# Patient Record
Sex: MALE | Race: WHITE | NOT HISPANIC OR LATINO
[De-identification: names, ages, dates, MRNs, and addresses within clinical notes are randomized per-mention and may not be internally consistent; named-entity substitution may affect disease eponyms.]

---

## 2023-03-16 PROBLEM — Z00.00 ENCOUNTER FOR PREVENTIVE HEALTH EXAMINATION: Status: ACTIVE | Noted: 2023-03-16

## 2023-03-20 ENCOUNTER — APPOINTMENT (OUTPATIENT)
Dept: ORTHOPEDIC SURGERY | Facility: CLINIC | Age: 28
End: 2023-03-20
Payer: COMMERCIAL

## 2023-03-20 VITALS — HEIGHT: 68 IN | WEIGHT: 155 LBS | BODY MASS INDEX: 23.49 KG/M2

## 2023-03-20 DIAGNOSIS — Z78.9 OTHER SPECIFIED HEALTH STATUS: ICD-10-CM

## 2023-03-20 PROCEDURE — 99204 OFFICE O/P NEW MOD 45 MIN: CPT

## 2023-03-20 PROCEDURE — 73010 X-RAY EXAM OF SHOULDER BLADE: CPT | Mod: RT

## 2023-03-20 PROCEDURE — 73030 X-RAY EXAM OF SHOULDER: CPT | Mod: RT

## 2023-03-20 NOTE — REASON FOR VISIT
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  He felt it come out, then shifted it back into place on his own.  He had some tingling, that has subsided.  He continued to climb the remainder of that day and was doing fine.  He has not been back to climbing since, as he tried with discomfort.  He is sleeping.  He can reach and lift.  He saw his PCP and was sent for an MRI.

## 2023-03-20 NOTE — HISTORY OF PRESENT ILLNESS
[Sudden] : sudden [5] : 5 [1] : 2 [Rest] : rest [Meds] : meds [] : no [FreeTextEntry1] : right shoulder  [FreeTextEntry3] : 02/24/2023 [FreeTextEntry5] : pt was rock climbing at the gym and his foot slipped and he partial dislocated his right shoulder about 3 weeks ago [FreeTextEntry9] : [facility]  [de-identified] : movement  [de-identified] : 03/2023 [de-identified] : his PCP  [de-identified] : MRI

## 2023-03-20 NOTE — ASSESSMENT
[FreeTextEntry1] : We reviewed the findings and the history.\par PT is prescribed. \par He will follow up in 6 weeks. \par Questions were answered and concerned addressed.\par The options were outlined.\par Return to activities outlined. \par \par Patient seen by Dr. Hermes Fox.\par Progress note completed by Nilda HUANG.\par Patient seen by Nilda HUANG under the supervision of Dr. Hermes Fox.\par Entered by Nilda HUANG acting as a scribe for Dr. Hermes Fox.\par Entered by Trinidad Zafar acting as scribe.

## 2023-03-20 NOTE — CONSULT LETTER
[Dear  ___] : Dear  [unfilled], [Consult Letter:] : I had the pleasure of evaluating your patient, [unfilled]. [Please see my note below.] : Please see my note below. [Consult Closing:] : Thank you very much for allowing me to participate in the care of this patient.  If you have any questions, please do not hesitate to contact me. [Sincerely,] : Sincerely, [FreeTextEntry3] : Hermes Dunham M.D.\par Shoulder Surgery

## 2023-03-20 NOTE — PHYSICAL EXAM
[Right] : right shoulder [Minimal] : minimal [5 ___] : forward flexion 5[unfilled]/5 [5___] : external rotation 5[unfilled]/5 [] : no sensory deficits [Left] : left shoulder [Sitting] : sitting [de-identified] : No pain with posterior stress.  [FreeTextEntry9] : IR to T10. [TWNoteComboBox4] : passive forward flexion 175 degrees [de-identified] : external rotation 90 degrees

## 2023-05-01 ENCOUNTER — APPOINTMENT (OUTPATIENT)
Dept: ORTHOPEDIC SURGERY | Facility: CLINIC | Age: 28
End: 2023-05-01
Payer: COMMERCIAL

## 2023-05-01 VITALS — BODY MASS INDEX: 22.73 KG/M2 | HEIGHT: 68 IN | WEIGHT: 150 LBS

## 2023-05-01 PROCEDURE — 99214 OFFICE O/P EST MOD 30 MIN: CPT

## 2023-05-01 NOTE — HISTORY OF PRESENT ILLNESS
[1] : 2 [0] : 0 [de-identified] : pt is here today for a follow up for his right shoulder. pt states his pain has improved since last visit, has very minimal pain to his shoulder now  [FreeTextEntry1] : right shoulder  [de-identified] : physical therapy

## 2023-05-01 NOTE — REASON FOR VISIT
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  He felt it come out, then shifted it back into place on his own.  He continued to climb the remainder of that day and was doing fine.  He has not been back to climbing since, as he tried with discomfort.  He is sleeping.  He can reach and lift.  He saw his PCP and was sent for an MRI.  Overall, he feels much better.  He has been to PT.

## 2023-05-01 NOTE — PHYSICAL EXAM
[Right] : right shoulder [5 ___] : forward flexion 5[unfilled]/5 [5___] : external rotation 5[unfilled]/5 [Left] : left shoulder [Sitting] : sitting [] : no sensory deficits [de-identified] : No pain with posterior stress.  [FreeTextEntry9] : IR to T10. [TWNoteComboBox4] : passive forward flexion 175 degrees [de-identified] : external rotation 90 degrees

## 2023-05-01 NOTE — ASSESSMENT
[FreeTextEntry1] : He is making gains.\par I have advised he avoid climbing or now.\par cont PT and HEP.\par Questions answered.\par Caution discussed.\par \par Patient seen by Dr. Hermes Fox.\par Progress note completed by Nilda HUANG.\par Patient seen by Nilda HUANG under the supervision of Dr. Hermes Fox.\par Entered by Nilda HUANG acting as a scribe for Dr. Hermes Fox.\par Entered by Trinidad Zafar acting as scribe.

## 2023-06-12 ENCOUNTER — APPOINTMENT (OUTPATIENT)
Dept: ORTHOPEDIC SURGERY | Facility: CLINIC | Age: 28
End: 2023-06-12
Payer: COMMERCIAL

## 2023-06-12 VITALS — HEIGHT: 68 IN | WEIGHT: 155 LBS | BODY MASS INDEX: 23.49 KG/M2

## 2023-06-12 PROCEDURE — 99214 OFFICE O/P EST MOD 30 MIN: CPT

## 2023-06-12 NOTE — REASON FOR VISIT
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  He felt it come out, then shifted it back into place on his own.  He continued to climb the remainder of that day and was doing fine.  He has not been back to climbing since, as he tried with discomfort.  He is sleeping.  He can reach and lift.  He saw his PCP and was sent for an MRI.  He continues with PT.  He has minimal pain.  No feelings of instability.

## 2023-06-12 NOTE — PHYSICAL EXAM
[Right] : right shoulder [5 ___] : forward flexion 5[unfilled]/5 [5___] : external rotation 5[unfilled]/5 [Left] : left shoulder [Sitting] : sitting [] : no sensory deficits [de-identified] : No pain with posterior stress.  [FreeTextEntry9] : IR to T10. [TWNoteComboBox4] : passive forward flexion 175 degrees [de-identified] : external rotation 90 degrees

## 2023-06-12 NOTE — ASSESSMENT
[FreeTextEntry1] : He feels ~85% better.\par He will cont PT.\par I have cautioned him about climbing.\par He will give this more time.\par Recurrence risks noted.\par Questions answered.\par F/u is at his request.\par \par Patient seen by Dr. Hermes Fox.\par Progress note completed by Nilda HUANG.\par Patient seen by Nilda HUANG under the supervision of Dr. Hermes Fox.\par Entered by Nilda HUANG acting as a scribe for Dr. Hermes Fox.

## 2023-06-12 NOTE — HISTORY OF PRESENT ILLNESS
[0] : 0 [de-identified] : pt is here today for a follow up for his right shoulder. pt states his pain has been improving, only really has soreness now  [FreeTextEntry1] : right shoulder  [de-identified] : physical therapy

## 2023-06-20 ENCOUNTER — APPOINTMENT (OUTPATIENT)
Dept: ORTHOPEDIC SURGERY | Facility: CLINIC | Age: 28
End: 2023-06-20
Payer: COMMERCIAL

## 2023-06-20 VITALS — BODY MASS INDEX: 23.49 KG/M2 | HEIGHT: 68 IN | WEIGHT: 155 LBS

## 2023-06-20 PROCEDURE — 99214 OFFICE O/P EST MOD 30 MIN: CPT

## 2023-06-20 NOTE — DATA REVIEWED
[FreeTextEntry1] : 3/8/23\par [facility] MRI Right Shoulder\par Impression:\par bone contusion of the anterior aspect of the lesser tuberosity, without evidence of fractures. \par Nondisplaced tear of the posterior superior labrum.

## 2023-06-20 NOTE — PHYSICAL EXAM
[de-identified] : Neurovascularly intact distally \par \par Right Shoulder: \par Full ROM\par +posterior loading shift\par +posterior crank test \par no effusion  09-Mar-2019 19:39

## 2023-06-20 NOTE — DISCUSSION/SUMMARY
[de-identified] : Reviewed all images with patient. \par Advised to continue physical activity as normal if no pain.\par MRI arthrogram of the right shoulder to eval reverse bankart.\par Follow up after MRI arthro scan.\par \par \par -----------------------------------------------\par Home Exercise\par The patient is instructed on a home exercise program.\par \par JOSÉ ALMANZA Acting as a Scribe for Dr. Blas\par I, José Almanza, attest that this documentation has been prepared under the direction and in the presence of Provider Trip Blas MD.\par \par Activity Modification\par The patient was advised to modify their activities.\par \par Dx / Natural History\par The patient was advised of the diagnosis.  The natural history of the pathology was explained in full to the patient in layman's terms.  Several different treatment options were discussed and explained in full to the patient including the risks and benefits of both surgical and non-surgical treatments.  All questions and concerns were answered.\par \par Pain Guide Activities\par The patient was advised to let pain guide the gradual advancement of activities.\par \par RICE\par I explained to the patient that rest, ice, compression, and elevation would benefit them.  They may return to activity after follow-up or when they no longer have any pain.\par \par The patient's current medication management of their orthopedic diagnosis was reviewed today:\par (1) We discussed a comprehensive treatment plan that included possible pharmaceutical management involving the use of prescription strength medications including but not limited to options such as oral Naprosyn 500mg BID, once daily Meloxicam 15 mg, or 500-650 mg Tylenol versus over the counter oral medications and topical prescription NSAID Pennsaid vs over the counter Voltaren gel.\par (2) There is a moderate risk of morbidity with further treatment, especially from use of prescription strength medications and possible side effects of these medications which include upset stomach with oral medications, skin reactions to topical medications and cardiac/renal issues with long term use.\par (3) I recommended that the patient follow-up with their medical physician to discuss any significant specific potential issues with long term medication use such as interactions with current medications or with exacerbation of underlying medical comorbidities.\par (4) The benefits and risks associated with use of injectable, oral or topical, prescription and over the counter anti-inflammatory medications were discussed with the patient. The patient voiced understanding of the risks including but not limited to bleeding, stroke, kidney dysfunction, heart disease, and were referred to the black box warning label for further information. \par \par

## 2023-06-20 NOTE — HISTORY OF PRESENT ILLNESS
[de-identified] : The patient is a 28 year old R hand dominant male who presents today complaining of R shoulder pain.  \par Date of Injury/Onset: 02/24/23\par Pain:    At Rest: 0/10 \par With Activity:  3/10 \par Mechanism of injury: Patient reported shoulder subluxation while attempting to warm-up for rock climbing\par This is NOT a Work Related Injury being treated under Worker's Compensation.\par This is NOT an athletic injury occurring associated with an interscholastic or organized sports team.\par Quality of symptoms: soreness\par Improves with: PT\par Worse with: N/A\par Prior treatment: [facility] - [facility] Dr. 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

## 2023-06-30 ENCOUNTER — RESULT REVIEW (OUTPATIENT)
Age: 28
End: 2023-06-30

## 2023-07-14 ENCOUNTER — APPOINTMENT (OUTPATIENT)
Dept: ORTHOPEDIC SURGERY | Facility: CLINIC | Age: 28
End: 2023-07-14
Payer: COMMERCIAL

## 2023-07-14 VITALS — HEIGHT: 68 IN | WEIGHT: 155 LBS | BODY MASS INDEX: 23.49 KG/M2

## 2023-07-14 PROCEDURE — 99214 OFFICE O/P EST MOD 30 MIN: CPT

## 2023-07-14 NOTE — DATA REVIEWED
[FreeTextEntry1] : 6/3023\par [facility] MRI Right Shoulder\par Impression:\par 1. Near circumferential labrum tear, sparing of the inferior labrum.\par \par 2. Moderate supraspinatus and low-grade partial tear posteriorly. Mild\par infraspinatus tendinosis with shallow partial tearing. Short segment\par longitudinal split tear of the intra-articular long head biceps tendon.\par

## 2023-07-14 NOTE — HISTORY OF PRESENT ILLNESS
[de-identified] : The patient is a 28 year old R hand dominant male who presents today complaining of R shoulder pain.  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

## 2023-07-14 NOTE — DISCUSSION/SUMMARY
[de-identified] : Reviewed all images with patient.\par Physical therapy prescribed for strengthening and stretching.\par \par Surgical Consent:\par \par -Conservative treatment, nontreatment, nonsurgical intervention and surgical intervention treatment options have been reviewed with the patient.  The patient continues to be symptomatic [and has failed conservative treatment], and elects to move forward with surgical intervention.  The patient is indicated for RIGHT SHOULDER ARTHROSCOPIC ANTERIOR AND POSTERIOR LABRAL REPAIR, SLAP REPAIR, ARTHROSCOPIC PROXIMAL BICEPS TENODESIS and all indicated procedures. As such the alternatives, benefits and risks, of the above procedure, including but not limited to bleeding, infection, neurovascular injury, loss of limb, loss of life,  DVT, PE, RSD, inability to return to previous level of activity, inability to return to previous level of employment, advancement of or to osteoarthritic changes, joint instability or motion loss, hardware failure or migration,  failure to resolve all symptoms, failure to return to sports and need for further procedures, as well as specific risk of RE-TEAR, OA were discussed with the patient and/or their legal guardian who agreed to move forward with surgical intervention.  They have reviewed and signed the consent form today after expressing understanding of the above documented conversation. The patient or their representative will contact my office as instructed on the preoperative instruction sheet they received today to schedule surgery in a timely manner as discussed.\par \par -As a post-operative protocol, I am prescribing an iceless cold/heat compression therapy device for at home use to be used 3-5 times per day at 40 degrees for 35 days as an alternative to pain medication. I would like my patient to begin with simultaneous cold & compression therapy at 10mm pressure. At the patients follow up I will determine whether they should continue with cold, or if they should transition to contrast cold/heat compression therapy. Unlike a conventional cold therapy unit that requires ice, the ThermX iceless device is set to a prescribed temperature that it will remain throughout the entire duration of use, whether that be cold compression, heat compression, or contrast compression. Cold therapy units that depend on ice melt over a very short period and do not provide compression which limits the compliance and effectiveness for pain/inflammation reduction that I am targeting for my patient. I have reached out to "[employer]" [facility] to supply this device as they are the exclusive provider of the ThermX and the patient will be contacted and instructed on how to utilize the device.\par ------------ \par \par ***patient opting for surgery, however can not get it done till winter.\par ***patient is getting  in September, will follow up after that to schedule surgery.\par \par \par \par \par -----------------------------------------------\par Home Exercise\par The patient is instructed on a home exercise program.\par \par YVETTE BRADEN Acting as a Scribe for Dr. Blas\par I, Yvette Braden, attest that this documentation has been prepared under the direction and in the presence of Provider Trip Blas MD.\par \par Activity Modification\par The patient was advised to modify their activities.\par \par Dx / Natural History\par The patient was advised of the diagnosis.  The natural history of the pathology was explained in full to the patient in layman's terms.  Several different treatment options were discussed and explained in full to the patient including the risks and benefits of both surgical and non-surgical treatments.  All questions and concerns were answered.\par \par Pain Guide Activities\par The patient was advised to let pain guide the gradual advancement of activities.\par \par RICE\par I explained to the patient that rest, ice, compression, and elevation would benefit them.  They may return to activity after follow-up or when they no longer have any pain.\par \par The patient's current medication management of their orthopedic diagnosis was reviewed today:\par (1) We discussed a comprehensive treatment plan that included possible pharmaceutical management involving the use of prescription strength medications including but not limited to options such as oral Naprosyn 500mg BID, once daily Meloxicam 15 mg, or 500-650 mg Tylenol versus over the counter oral medications and topical prescription NSAID Pennsaid vs over the counter Voltaren gel.\par (2) There is a moderate risk of morbidity with further treatment, especially from use of prescription strength medications and possible side effects of these medications which include upset stomach with oral medications, skin reactions to topical medications and cardiac/renal issues with long term use.\par (3) I recommended that the patient follow-up with their medical physician to discuss any significant specific potential issues with long term medication use such as interactions with current medications or with exacerbation of underlying medical comorbidities.\par (4) The benefits and risks associated with use of injectable, oral or topical, prescription and over the counter anti-inflammatory medications were discussed with the patient. The patient voiced understanding of the risks including but not limited to bleeding, stroke, kidney dysfunction, heart disease, and were referred to the black box warning label for further information.\par \par

## 2023-07-14 NOTE — PHYSICAL EXAM
[de-identified] : Neurovascularly intact distally \par \par Right Shoulder: \par Full ROM\par +posterior loading shift\par +posterior crank test \par no effusion

## 2023-09-19 ENCOUNTER — APPOINTMENT (OUTPATIENT)
Dept: ORTHOPEDIC SURGERY | Facility: CLINIC | Age: 28
End: 2023-09-19
Payer: COMMERCIAL

## 2023-09-19 VITALS — WEIGHT: 155 LBS | BODY MASS INDEX: 23.49 KG/M2 | HEIGHT: 68 IN

## 2023-09-19 VITALS — BODY MASS INDEX: 23.49 KG/M2 | HEIGHT: 68 IN | WEIGHT: 155 LBS

## 2023-09-19 PROCEDURE — 99214 OFFICE O/P EST MOD 30 MIN: CPT

## 2023-09-19 PROCEDURE — L3670: CPT | Mod: RT

## 2023-10-12 ENCOUNTER — APPOINTMENT (OUTPATIENT)
Dept: ORTHOPEDIC SURGERY | Facility: AMBULATORY SURGERY CENTER | Age: 28
End: 2023-10-12
Payer: COMMERCIAL

## 2023-10-12 PROCEDURE — 29823 SHO ARTHRS SRG XTNSV DBRDMT: CPT | Mod: AS,59,RT

## 2023-10-12 PROCEDURE — 29807 SHO ARTHRS SRG RPR SLAP LES: CPT | Mod: AS,59,RT

## 2023-10-12 PROCEDURE — 29806 SHO ARTHRS SRG CAPSULORRAPHY: CPT | Mod: AS,RT

## 2023-10-12 PROCEDURE — 29823 SHO ARTHRS SRG XTNSV DBRDMT: CPT | Mod: 59,RT

## 2023-10-12 PROCEDURE — 29806 SHO ARTHRS SRG CAPSULORRAPHY: CPT | Mod: RT

## 2023-10-12 PROCEDURE — 29807 SHO ARTHRS SRG RPR SLAP LES: CPT | Mod: 59,RT

## 2023-10-12 RX ORDER — IBUPROFEN 800 MG/1
800 TABLET, FILM COATED ORAL TWICE DAILY
Qty: 28 | Refills: 0 | Status: ACTIVE | COMMUNITY
Start: 2023-10-12 | End: 1900-01-01

## 2023-10-12 RX ORDER — DOCUSATE SODIUM 50 MG/1
50 CAPSULE, LIQUID FILLED ORAL
Qty: 21 | Refills: 0 | Status: ACTIVE | COMMUNITY
Start: 2023-10-12 | End: 1900-01-01

## 2023-10-12 RX ORDER — OXYCODONE AND ACETAMINOPHEN 5; 325 MG/1; MG/1
5-325 TABLET ORAL
Qty: 40 | Refills: 0 | Status: DISCONTINUED | COMMUNITY
Start: 2023-10-12 | End: 2023-10-12

## 2023-10-12 RX ORDER — OXYCODONE AND ACETAMINOPHEN 5; 325 MG/1; MG/1
5-325 TABLET ORAL
Qty: 40 | Refills: 0 | Status: ACTIVE | COMMUNITY
Start: 2023-10-12 | End: 1900-01-01

## 2023-10-12 RX ORDER — ONDANSETRON 4 MG/1
4 TABLET, ORALLY DISINTEGRATING ORAL EVERY 8 HOURS
Qty: 12 | Refills: 0 | Status: ACTIVE | COMMUNITY
Start: 2023-10-12 | End: 1900-01-01

## 2023-10-24 ENCOUNTER — APPOINTMENT (OUTPATIENT)
Dept: ORTHOPEDIC SURGERY | Facility: CLINIC | Age: 28
End: 2023-10-24
Payer: COMMERCIAL

## 2023-10-24 VITALS — WEIGHT: 155 LBS | BODY MASS INDEX: 23.49 KG/M2 | HEIGHT: 68 IN

## 2023-10-24 PROCEDURE — 99024 POSTOP FOLLOW-UP VISIT: CPT

## 2023-12-05 ENCOUNTER — APPOINTMENT (OUTPATIENT)
Dept: ORTHOPEDIC SURGERY | Facility: CLINIC | Age: 28
End: 2023-12-05
Payer: COMMERCIAL

## 2023-12-05 VITALS — WEIGHT: 155 LBS | BODY MASS INDEX: 23.49 KG/M2 | HEIGHT: 68 IN

## 2023-12-05 PROCEDURE — 99024 POSTOP FOLLOW-UP VISIT: CPT

## 2024-02-06 ENCOUNTER — APPOINTMENT (OUTPATIENT)
Dept: ORTHOPEDIC SURGERY | Facility: CLINIC | Age: 29
End: 2024-02-06
Payer: COMMERCIAL

## 2024-02-06 VITALS — BODY MASS INDEX: 23.49 KG/M2 | WEIGHT: 155 LBS | HEIGHT: 68 IN

## 2024-02-06 PROCEDURE — 99214 OFFICE O/P EST MOD 30 MIN: CPT

## 2024-02-06 NOTE — DISCUSSION/SUMMARY
[de-identified] : Patient is progressing well on post operative care. Patient will continue physical therapy for strengthening and stretching.  Patient will follow up in 2 months for possible clearance.    ----------------------------------------------- Home Exercise The patient is instructed on a home exercise program.  JOSÉ ALMANZA Acting as a Scribe for Dr. Wan PEARCE, José Almanza, attest that this documentation has been prepared under the direction and in the presence of Provider Trip Blas MD.  Activity Modification The patient was advised to modify their activities.  Dx / Natural History The patient was advised of the diagnosis.  The natural history of the pathology was explained in full to the patient in layman's terms.  Several different treatment options were discussed and explained in full to the patient including the risks and benefits of both surgical and non-surgical treatments.  All questions and concerns were answered.  Pain Guide Activities The patient was advised to let pain guide the gradual advancement of activities.  RICE I explained to the patient that rest, ice, compression, and elevation would benefit them.  They may return to activity after follow-up or when they no longer have any pain.  The patient's current medication management of their orthopedic diagnosis was reviewed today: (1) We discussed a comprehensive treatment plan that included possible pharmaceutical management involving the use of prescription strength medications including but not limited to options such as oral Naprosyn 500mg BID, once daily Meloxicam 15 mg, or 500-650 mg Tylenol versus over the counter oral medications and topical prescription NSAID Pennsaid vs over the counter Voltaren gel. (2) There is a moderate risk of morbidity with further treatment, especially from use of prescription strength medications and possible side effects of these medications which include upset stomach with oral medications, skin reactions to topical medications and cardiac/renal issues with long term use. (3) I recommended that the patient follow-up with their medical physician to discuss any significant specific potential issues with long term medication use such as interactions with current medications or with exacerbation of underlying medical comorbidities. (4) The benefits and risks associated with use of injectable, oral or topical, prescription and over the counter anti-inflammatory medications were discussed with the patient. The patient voiced understanding of the risks including but not limited to bleeding, stroke, kidney dysfunction, heart disease, and were referred to the black box warning label for further information.

## 2024-02-06 NOTE — HISTORY OF PRESENT ILLNESS
[de-identified] : The patient is s/p RT shoulder arthroscopic posterior labral repair, arthroscopic anterior labral repair, arthroscopic superior labral repair, limited debridement. Date of Surgery: 10/12/23 Pain:    At Rest: 0/10  With Activity:  1/10  Mechanism of injury: _ This is NOT a Work Related Injury being treated under Worker's Compensation. This is NOT  an athletic injury occurring associated with an interscholastic or organized sports team. Treatment/Imaging/Studies Since Last Visit: PT at [facility] [place] 	Reports Available For Review Today: Out of work/sport: _, since   School/Sport/Position/Occupation:_ Change since last visit: PT  Additional Information: None

## 2024-02-06 NOTE — PHYSICAL EXAM
[de-identified] : Neurovascularly intact distally   Right Shoulder: No effusion, clean and dry incisions, intact skin, no fluctuance, no sign of infection, no wound erythema, no induration, no drainage. Full ROM Ligamental stable  Nontender

## 2024-04-09 ENCOUNTER — APPOINTMENT (OUTPATIENT)
Dept: ORTHOPEDIC SURGERY | Facility: CLINIC | Age: 29
End: 2024-04-09
Payer: COMMERCIAL

## 2024-04-09 VITALS — WEIGHT: 155 LBS | BODY MASS INDEX: 23.49 KG/M2 | HEIGHT: 68 IN

## 2024-04-09 DIAGNOSIS — M79.18 MYALGIA, OTHER SITE: ICD-10-CM

## 2024-04-09 DIAGNOSIS — S43.431A SUPERIOR GLENOID LABRUM LESION OF RIGHT SHOULDER, INITIAL ENCOUNTER: ICD-10-CM

## 2024-04-09 DIAGNOSIS — S43.004A UNSPECIFIED DISLOCATION OF RIGHT SHOULDER JOINT, INITIAL ENCOUNTER: ICD-10-CM

## 2024-04-09 PROCEDURE — 99214 OFFICE O/P EST MOD 30 MIN: CPT

## 2024-04-09 NOTE — DISCUSSION/SUMMARY
[de-identified] : Patient has progressed well with post operative care. Advised patient to continue home exercises.  Patient will follow up as needed.    ----------------------------------------------- Home Exercise The patient is instructed on a home exercise program.  JOSÉ ALMANZA Acting as a Scribe for Dr. Wan PEARCE, José Almanza, attest that this documentation has been prepared under the direction and in the presence of Provider Trip Blas MD.  Activity Modification The patient was advised to modify their activities.  Dx / Natural History The patient was advised of the diagnosis.  The natural history of the pathology was explained in full to the patient in layman's terms.  Several different treatment options were discussed and explained in full to the patient including the risks and benefits of both surgical and non-surgical treatments.  All questions and concerns were answered.  Pain Guide Activities The patient was advised to let pain guide the gradual advancement of activities.  KATE PEARCE explained to the patient that rest, ice, compression, and elevation would benefit them.  They may return to activity after follow-up or when they no longer have any pain.  The patient's current medication management of their orthopedic diagnosis was reviewed today: (1) We discussed a comprehensive treatment plan that included possible pharmaceutical management involving the use of prescription strength medications including but not limited to options such as oral Naprosyn 500mg BID, once daily Meloxicam 15 mg, or 500-650 mg Tylenol versus over the counter oral medications and topical prescription NSAID Pennsaid vs over the counter Voltaren gel. (2) There is a moderate risk of morbidity with further treatment, especially from use of prescription strength medications and possible side effects of these medications which include upset stomach with oral medications, skin reactions to topical medications and cardiac/renal issues with long term use. (3) I recommended that the patient follow-up with their medical physician to discuss any significant specific potential issues with long term medication use such as interactions with current medications or with exacerbation of underlying medical comorbidities. (4) The benefits and risks associated with use of injectable, oral or topical, prescription and over the counter anti-inflammatory medications were discussed with the patient. The patient voiced understanding of the risks including but not limited to bleeding, stroke, kidney dysfunction, heart disease, and were referred to the black box warning label for further information.

## 2024-04-09 NOTE — HISTORY OF PRESENT ILLNESS
[de-identified] : The patient is s/p RT shoulder arthroscopic posterior labral repair, arthroscopic anterior labral repair, arthroscopic superior labral repair, limited debridement. Date of Surgery: 10/12/23 Pain:    At Rest: 0/10  With Activity:  0/10  Mechanism of injury: _ This is NOT a Work Related Injury being treated under Worker's Compensation. This is NOT  an athletic injury occurring associated with an interscholastic or organized sports team. 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

## 2024-04-09 NOTE — PHYSICAL EXAM
[de-identified] : Neurovascularly intact distally   Right Shoulder: No effusion, clean and dry incisions, intact skin, no fluctuance, no sign of infection, no wound erythema, no induration, no drainage. Full ROM Ligamental stable  Nontender

## 2024-04-09 NOTE — ADDENDUM
[FreeTextEntry1] :  Documented by Barbara Almanza acting as a scribe for Dr. Blas and Neville Barron PA-C on 04/09/2024 and was presence for the following sections: Physical Exam; Data Reviewed; Assessment; Discussion/Summary. All medical record entries made by the Scribe were at my, Dr. Blas, and Neville Barron, direction and personally dictated by me on 04/09/2024. I have reviewed the chart and agree that the record accurately reflects my personal performance of the history, physical exam, procedure and imaging.